# Patient Record
Sex: FEMALE | Race: BLACK OR AFRICAN AMERICAN | Employment: FULL TIME | URBAN - METROPOLITAN AREA
[De-identification: names, ages, dates, MRNs, and addresses within clinical notes are randomized per-mention and may not be internally consistent; named-entity substitution may affect disease eponyms.]

---

## 2017-12-15 PROBLEM — R00.2 PALPITATIONS: Status: ACTIVE | Noted: 2017-12-15

## 2018-01-09 PROBLEM — Z79.01 CHRONIC ANTICOAGULATION: Status: ACTIVE | Noted: 2018-01-09

## 2018-04-16 PROBLEM — J30.1 SEASONAL ALLERGIC RHINITIS DUE TO POLLEN: Status: ACTIVE | Noted: 2018-04-16

## 2018-08-15 ENCOUNTER — HOSPITAL ENCOUNTER (EMERGENCY)
Age: 58
Discharge: HOME | End: 2018-08-15
Attending: EMERGENCY MEDICINE
Payer: COMMERCIAL

## 2018-08-15 VITALS
WEIGHT: 160 LBS | DIASTOLIC BLOOD PRESSURE: 68 MMHG | HEIGHT: 64 IN | SYSTOLIC BLOOD PRESSURE: 121 MMHG | OXYGEN SATURATION: 96 % | HEART RATE: 65 BPM | TEMPERATURE: 98 F | RESPIRATION RATE: 18 BRPM | BODY MASS INDEX: 27.31 KG/M2

## 2018-08-15 DIAGNOSIS — T14.8XXA MUSCLE STRAIN: Primary | ICD-10-CM

## 2018-08-15 PROCEDURE — 99283 EMERGENCY DEPT VISIT LOW MDM: CPT | Performed by: PHYSICIAN ASSISTANT

## 2018-08-15 RX ORDER — ACETAMINOPHEN 500 MG
500-1000 TABLET ORAL
Qty: 20 TAB | Refills: 0 | Status: SHIPPED | OUTPATIENT
Start: 2018-08-15 | End: 2018-12-18
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
## 2018-08-15 NOTE — ED NOTES
I have reviewed discharge instructions with the patient. The patient verbalized understanding. Patient left ED via Discharge Method: ambulatory to Home with (self). Opportunity for questions and clarification provided. Patient given 1 scripts. To continue your aftercare when you leave the hospital, you may receive an automated call from our care team to check in on how you are doing. This is a free service and part of our promise to provide the best care and service to meet your aftercare needs.  If you have questions, or wish to unsubscribe from this service please call [phone]. Thank you for Choosing our [facility] Emergency Department.

## 2018-08-17 ENCOUNTER — HOSPITAL ENCOUNTER (OUTPATIENT)
Dept: LAB | Age: 58
Discharge: HOME | End: 2018-08-17
Payer: COMMERCIAL

## 2018-08-17 DIAGNOSIS — Z79.01 CHRONIC ANTICOAGULATION: ICD-10-CM

## 2018-08-17 PROBLEM — R53.82 CHRONIC FATIGUE: Status: ACTIVE | Noted: 2018-08-17

## 2018-08-17 LAB
BASOPHILS # BLD: 0 K/UL
BASOPHILS NFR BLD: 1 % (ref 0–2)
DIFFERENTIAL METHOD BLD: NORMAL
EOSINOPHIL # BLD: 0.1 K/UL
EOSINOPHIL NFR BLD: 1 % (ref 0.5–7.8)
ERYTHROCYTE [DISTWIDTH] IN BLOOD BY AUTOMATED COUNT: 13 %
HCT VFR BLD AUTO: 37.6 % (ref 35.8–46.3)
HGB BLD-MCNC: 12.7 G/DL (ref 11.7–15.4)
IMM GRANULOCYTES # BLD: 0 K/UL
IMM GRANULOCYTES NFR BLD AUTO: 0 % (ref 0–5)
LYMPHOCYTES # BLD: 1.9 K/UL
LYMPHOCYTES NFR BLD: 42 % (ref 13–44)
MCH RBC QN AUTO: 30.4 PG (ref 26.1–32.9)
MCHC RBC AUTO-ENTMCNC: 33.8 G/DL (ref 31.4–35)
MCV RBC AUTO: 90 FL (ref 79.6–97.8)
MONOCYTES # BLD: 0.3 K/UL
MONOCYTES NFR BLD: 7 % (ref 4–12)
NEUTS SEG # BLD: 2.3 K/UL
NEUTS SEG NFR BLD: 49 % (ref 43–78)
NRBC # BLD: 0 K/UL (ref 0–0.2)
PLATELET # BLD AUTO: 178 K/UL (ref 150–450)
PMV BLD AUTO: 10.2 FL (ref 9.4–12.3)
RBC # BLD AUTO: 4.18 M/UL (ref 4.05–5.2)
WBC # BLD AUTO: 4.6 K/UL (ref 4.3–11.1)

## 2018-08-17 PROCEDURE — 85025 COMPLETE CBC W/AUTO DIFF WBC: CPT

## 2018-08-17 PROCEDURE — 36415 COLL VENOUS BLD VENIPUNCTURE: CPT

## 2021-07-07 ENCOUNTER — TRANSCRIBE ORDER (OUTPATIENT)
Dept: SCHEDULING | Age: 61
End: 2021-07-07

## 2021-07-07 DIAGNOSIS — Z12.31 SCREENING MAMMOGRAM FOR HIGH-RISK PATIENT: Primary | ICD-10-CM

## 2021-07-12 ENCOUNTER — HOSPITAL ENCOUNTER (OUTPATIENT)
Dept: MAMMOGRAPHY | Age: 61
Discharge: HOME | End: 2021-07-12
Attending: FAMILY MEDICINE

## 2021-07-12 DIAGNOSIS — Z12.31 SCREENING MAMMOGRAM FOR HIGH-RISK PATIENT: ICD-10-CM

## 2022-03-18 PROBLEM — J30.1 SEASONAL ALLERGIC RHINITIS DUE TO POLLEN: Status: ACTIVE | Noted: 2018-04-16

## 2022-03-18 PROBLEM — R00.2 PALPITATIONS: Status: ACTIVE | Noted: 2017-12-15

## 2022-03-18 PROBLEM — R53.82 CHRONIC FATIGUE: Status: ACTIVE | Noted: 2018-08-17

## 2022-03-20 PROBLEM — Z79.01 CHRONIC ANTICOAGULATION: Status: ACTIVE | Noted: 2018-01-09

## 2022-07-18 ENCOUNTER — HOSPITAL ENCOUNTER (OUTPATIENT)
Dept: MAMMOGRAPHY | Age: 62
Discharge: HOME | End: 2022-07-21

## 2022-07-18 DIAGNOSIS — R92.1 MAMMOGRAPHIC CALCIFICATION FOUND ON DIAGNOSTIC IMAGING OF BREAST: ICD-10-CM

## 2022-07-19 ENCOUNTER — APPOINTMENT (OUTPATIENT)
Dept: MAMMOGRAPHY | Age: 62
End: 2022-07-19
Payer: COMMERCIAL

## 2022-07-19 ENCOUNTER — HOSPITAL ENCOUNTER (OUTPATIENT)
Dept: MAMMOGRAPHY | Age: 62
Discharge: HOME | End: 2022-07-22
Payer: COMMERCIAL

## 2022-07-19 PROCEDURE — 77065 DX MAMMO INCL CAD UNI: CPT

## 2023-06-30 ENCOUNTER — OFFICE VISIT (OUTPATIENT)
Age: 63
End: 2023-06-30
Payer: COMMERCIAL

## 2023-06-30 VITALS
SYSTOLIC BLOOD PRESSURE: 130 MMHG | WEIGHT: 158.6 LBS | HEIGHT: 64 IN | HEART RATE: 53 BPM | BODY MASS INDEX: 27.08 KG/M2 | DIASTOLIC BLOOD PRESSURE: 80 MMHG

## 2023-06-30 DIAGNOSIS — I48.0 PAROXYSMAL ATRIAL FIBRILLATION (HCC): Primary | ICD-10-CM

## 2023-06-30 DIAGNOSIS — R00.1 BRADYCARDIA: ICD-10-CM

## 2023-06-30 PROCEDURE — 3079F DIAST BP 80-89 MM HG: CPT | Performed by: INTERNAL MEDICINE

## 2023-06-30 PROCEDURE — 93000 ELECTROCARDIOGRAM COMPLETE: CPT | Performed by: INTERNAL MEDICINE

## 2023-06-30 PROCEDURE — 99204 OFFICE O/P NEW MOD 45 MIN: CPT | Performed by: INTERNAL MEDICINE

## 2023-06-30 PROCEDURE — 3075F SYST BP GE 130 - 139MM HG: CPT | Performed by: INTERNAL MEDICINE

## 2023-06-30 RX ORDER — LEVOTHYROXINE SODIUM 88 UG/1
TABLET ORAL
COMMUNITY

## 2023-06-30 RX ORDER — AMLODIPINE BESYLATE 5 MG/1
TABLET ORAL
COMMUNITY

## 2023-06-30 RX ORDER — ASPIRIN 81 MG/1
81 TABLET ORAL WEEKLY
COMMUNITY
End: 2023-06-30

## 2023-06-30 RX ORDER — PRAVASTATIN SODIUM 40 MG
TABLET ORAL
COMMUNITY

## 2023-06-30 RX ORDER — SOTALOL HYDROCHLORIDE 160 MG/1
TABLET ORAL
COMMUNITY
End: 2023-06-30

## 2023-06-30 RX ORDER — SOTALOL HYDROCHLORIDE 80 MG/1
80 TABLET ORAL 2 TIMES DAILY
Qty: 60 TABLET | Refills: 3 | Status: SHIPPED | OUTPATIENT
Start: 2023-06-30

## 2023-06-30 ASSESSMENT — ENCOUNTER SYMPTOMS: SHORTNESS OF BREATH: 0

## 2023-07-03 ENCOUNTER — TELEPHONE (OUTPATIENT)
Age: 63
End: 2023-07-03

## 2023-07-03 NOTE — TELEPHONE ENCOUNTER
MEDICATION REFILL REQUEST      Name of Medication:  Rivroxaban  Dose:  20 mg  Frequency:  1 a day  Quantity:  90  Days' supply:  80 with refills      Pharmacy Name/Location:  Please call pt ,she said Dr Brigitte Kathleen wrote a RX Friday but she left it in her sisters car [place],so she is asking for it to be called in .   Please call [phone] or [phone]

## 2023-07-05 NOTE — TELEPHONE ENCOUNTER
Spoke with pt made her aware prescription was sent to her requested pharmacy but required a prior auth. Prior auth submitted and approved. Pharmacy aware and will fill for pt. Attempted to call pt to make her aware and reached voicemail. Left message.

## 2023-07-05 NOTE — TELEPHONE ENCOUNTER
This request has been approved using information available on the patient's profile. [id number]; Review Type:Prior Auth; Coverage Start Date:06/05/2023; Coverage End Date:07/04/2024;

## 2023-07-07 ENCOUNTER — TRANSCRIBE ORDERS (OUTPATIENT)
Dept: SCHEDULING | Age: 63
End: 2023-07-07

## 2023-07-07 DIAGNOSIS — Z12.31 VISIT FOR SCREENING MAMMOGRAM: Primary | ICD-10-CM

## 2023-07-10 ENCOUNTER — HOSPITAL ENCOUNTER (OUTPATIENT)
Dept: MAMMOGRAPHY | Age: 63
Discharge: HOME | End: 2023-07-13
Payer: COMMERCIAL

## 2023-07-10 DIAGNOSIS — Z12.31 VISIT FOR SCREENING MAMMOGRAM: ICD-10-CM

## 2023-07-10 PROCEDURE — 77063 BREAST TOMOSYNTHESIS BI: CPT

## 2023-08-02 ENCOUNTER — TELEPHONE (OUTPATIENT)
Age: 63
End: 2023-08-02

## 2023-09-21 ENCOUNTER — TELEPHONE (OUTPATIENT)
Age: 63
End: 2023-09-21

## 2023-09-21 NOTE — TELEPHONE ENCOUNTER
Never got a call to schedule the procedure she was to have done. Has started on Xarelto and expensive.  Planning to retire next year and dont think she can afford it but right now its 61 a month Please call

## 2023-09-21 NOTE — TELEPHONE ENCOUNTER
I spoke with the patient and informed her we would have to address Xarelto cost when she changes insurance. I informed her I would have scheduling give her a call to schedule the TTE that was ordered on 06/30/2023.

## 2023-10-18 ENCOUNTER — TELEPHONE (OUTPATIENT)
Age: 63
End: 2023-10-18

## 2023-10-18 NOTE — TELEPHONE ENCOUNTER
MEDICATION REFILL REQUEST      Name of Medication:  Xarelto  Dose:  20 mg  Frequency:  QD  Quantity:  90  Days' supply:  90      Pharmacy Name/Location:  Call pt, no pharmacy left

## 2023-10-20 NOTE — TELEPHONE ENCOUNTER
Multiple attempts made and voicemail's left for the patient. Patient did not leave pharmacy location. Also, Xarelto was refilled on 06/30/2023, 90 day supply with three refills.

## 2024-04-01 NOTE — PROGRESS NOTES
in this patient's care.  Please call or contact me if there are any questions or concerns regarding the above.      MED ADORNO MD  04/02/24  3:37 PM

## 2024-04-02 ENCOUNTER — OFFICE VISIT (OUTPATIENT)
Age: 64
End: 2024-04-02
Payer: COMMERCIAL

## 2024-04-02 VITALS
DIASTOLIC BLOOD PRESSURE: 88 MMHG | BODY MASS INDEX: 27.66 KG/M2 | HEIGHT: 64 IN | WEIGHT: 162 LBS | HEART RATE: 56 BPM | SYSTOLIC BLOOD PRESSURE: 130 MMHG

## 2024-04-02 DIAGNOSIS — I10 ESSENTIAL HYPERTENSION: ICD-10-CM

## 2024-04-02 DIAGNOSIS — Z79.01 CHRONIC ANTICOAGULATION: ICD-10-CM

## 2024-04-02 DIAGNOSIS — I48.0 PAROXYSMAL ATRIAL FIBRILLATION (HCC): Primary | ICD-10-CM

## 2024-04-02 PROCEDURE — 99214 OFFICE O/P EST MOD 30 MIN: CPT | Performed by: INTERNAL MEDICINE

## 2024-04-02 PROCEDURE — 3079F DIAST BP 80-89 MM HG: CPT | Performed by: INTERNAL MEDICINE

## 2024-04-02 PROCEDURE — 3075F SYST BP GE 130 - 139MM HG: CPT | Performed by: INTERNAL MEDICINE

## 2024-04-02 ASSESSMENT — ENCOUNTER SYMPTOMS: SHORTNESS OF BREATH: 0

## 2024-04-25 ENCOUNTER — TELEPHONE (OUTPATIENT)
Age: 64
End: 2024-04-25

## 2024-04-25 NOTE — TELEPHONE ENCOUNTER
Patient having Colonoscopy on 06/25/24 with Dr. Hinton. Requesting risk assessment and Eliquis hold for 48 hours prior. Fax: [phone]

## 2024-04-25 NOTE — TELEPHONE ENCOUNTER
Per office note 4/2/24:    Low risk for colonoscopy and to hold Xarelto 24-48 hours prior.     Assessment faxed.

## 2024-04-26 ENCOUNTER — TELEPHONE (OUTPATIENT)
Age: 64
End: 2024-04-26

## 2024-04-26 NOTE — TELEPHONE ENCOUNTER
Gastroenterology calling stating that they received clearance holding Xarelto but patient told office that she is not on Xarelto because of price . She is on eliquis . Please advise hold for Eliquis. Fax: [phone]

## 2024-04-29 ENCOUNTER — TELEPHONE (OUTPATIENT)
Age: 64
End: 2024-04-29

## 2024-04-29 NOTE — TELEPHONE ENCOUNTER
----- Message from Norma Holbrook MD sent at 4/29/2024 12:53 PM EDT -----  Echo unchanged from previous.  Stay the course

## 2024-04-29 NOTE — TELEPHONE ENCOUNTER
Pt is calling saying Dr Krishna gave  her a card for her to get Xarelto prescription at a discount ,so pt called them and they said they need a RX,Please call Pt and speak with her anout this please

## 2024-04-29 NOTE — TELEPHONE ENCOUNTER
Called and spoke with patient and asked which blood thinner she had been taking because we were informed she was taking Eliquis not Xarelto. She stated that the Eliquis was upsetting her stomach so Dr. Krishna told her she could go back on Xarelto. Advised I would speak with Dr. Krishna and then speak to pharmacy. Voiced understanding.

## 2024-04-30 NOTE — TELEPHONE ENCOUNTER
Called and spoke with pharmacy to verify Xarelto rx was on file and that patient did not have an rx for Eliquis. Stated the Xarelto is ready and waiting for patient to .     Attempted to call patient to inform her it was ready but there was no answer. LVM for patient to return call.

## 2024-04-30 NOTE — TELEPHONE ENCOUNTER
Informed patient that Xarelto is ready for  and made sure she is aware that she is not supposed to take Eliquis if she takes Xarelto. Voiced understanding.

## 2024-06-26 ENCOUNTER — TELEPHONE (OUTPATIENT)
Age: 64
End: 2024-06-26

## 2024-06-26 NOTE — TELEPHONE ENCOUNTER
Pt walk in to [place] office asking for xarelto 20mg coupon. I gave her [facility] info and 4 bottles of samples.

## 2024-06-27 ENCOUNTER — TELEPHONE (OUTPATIENT)
Age: 64
End: 2024-06-27

## 2024-06-27 NOTE — TELEPHONE ENCOUNTER
MEDICATION REFILL REQUEST      Name of Medication: Xarelto  Dose:  20 mg  Frequency:  QD  Quantity:  90  Days' supply:  90 with 3 refills    Pharmacy Name/Location:  [facility] -

## 2025-02-04 ENCOUNTER — TRANSCRIBE ORDERS (OUTPATIENT)
Dept: SCHEDULING | Age: 65
End: 2025-02-04

## 2025-02-04 DIAGNOSIS — Z78.0 POSTMENOPAUSAL STATUS: Primary | ICD-10-CM

## 2025-02-04 DIAGNOSIS — Z12.31 OTHER SCREENING MAMMOGRAM: Primary | ICD-10-CM

## 2025-04-03 NOTE — PROGRESS NOTES
[facility] CARDIOLOGY  [address] [place] [address], [address]  [place] [address]  PHONE: [phone]      25    NAME:  Argentina Germain  : 1960  MRN: [id number]         SUBJECTIVE:   Argentina Germain is a 64 y.o. female seen for a follow up visit regarding the following:     Chief Complaint   Patient presents with    Follow-up    Atrial Fibrillation            HPI:  Follow up  Follow-up and Atrial Fibrillation   .    Follow up atrial fib, intermittent compliance.  She has felt ok overall. She has what she believes to be gas, she feels the heart skip a beat briefly then right back to normal.  Happens infrequently in general, had some the last couple of days.  Improves with belching, no other exacerbating factors.  Minimal caffeine.         PAST CARDIAC HISTORY:       PAF - sotalol, Eliquis - pt stopped eliquis and away from cardiology care a number of years         Ambulatory monitor - results not available         Echo - EF 53%, normal DF, mild LAE, mild MR, TR, RVSP 39, trace effusion  2023       Resumed AC with Xarelto.  No showed for echo  2024       EF 53%, septal thickening, abn DF, mild MR, mod TR, RVSP 40, mild LAE, small effusion                Cardiac Medications       Calcium Channel Blockers       amLODIPine (NORVASC) 5 MG tablet amlodipine 5 mg tablet   TAKE 1 TABLET BY MOUTH ONCE DAILY       Beta Blockers Non-Selective       sotalol (BETAPACE) 80 MG tablet Take 1 tablet by mouth 2 times daily       HMG CoA Reductase Inhibitors       pravastatin (PRAVACHOL) 40 MG tablet pravastatin 40 mg tablet       Direct Factor Xa Inhibitors       rivaroxaban (XARELTO) 20 MG TABS tablet Take 1 tablet by mouth Daily with supper                  Past Medical History, Past Surgical History, Family history, Social History, and Medications were all reviewed with the patient today and updated as necessary.     Prior to Admission medications    Medication Sig Start Date End Date Taking? Authorizing

## 2025-04-04 ENCOUNTER — OFFICE VISIT (OUTPATIENT)
Age: 65
End: 2025-04-04
Payer: MEDICARE

## 2025-04-04 VITALS
SYSTOLIC BLOOD PRESSURE: 110 MMHG | WEIGHT: 173 LBS | DIASTOLIC BLOOD PRESSURE: 70 MMHG | HEIGHT: 64 IN | HEART RATE: 51 BPM | BODY MASS INDEX: 29.53 KG/M2

## 2025-04-04 DIAGNOSIS — Z79.899 HIGH RISK MEDICATION USE: ICD-10-CM

## 2025-04-04 DIAGNOSIS — I10 ESSENTIAL HYPERTENSION: Primary | ICD-10-CM

## 2025-04-04 DIAGNOSIS — Z79.01 CHRONIC ANTICOAGULATION: ICD-10-CM

## 2025-04-04 DIAGNOSIS — E78.5 DYSLIPIDEMIA: ICD-10-CM

## 2025-04-04 DIAGNOSIS — I48.0 PAROXYSMAL ATRIAL FIBRILLATION (HCC): ICD-10-CM

## 2025-04-04 LAB
ANION GAP SERPL CALC-SCNC: 10 MMOL/L (ref 7–16)
BUN SERPL-MCNC: 12 MG/DL (ref 8–23)
CALCIUM SERPL-MCNC: 9 MG/DL (ref 8.8–10.2)
CHLORIDE SERPL-SCNC: 106 MMOL/L (ref 98–107)
CO2 SERPL-SCNC: 26 MMOL/L (ref 20–29)
CREAT SERPL-MCNC: 0.97 MG/DL (ref 0.6–1.1)
GLUCOSE SERPL-MCNC: 104 MG/DL (ref 70–99)
POTASSIUM SERPL-SCNC: 3.7 MMOL/L (ref 3.5–5.1)
SODIUM SERPL-SCNC: 142 MMOL/L (ref 136–145)

## 2025-04-04 PROCEDURE — G8400 PT W/DXA NO RESULTS DOC: HCPCS | Performed by: INTERNAL MEDICINE

## 2025-04-04 PROCEDURE — 3078F DIAST BP <80 MM HG: CPT | Performed by: INTERNAL MEDICINE

## 2025-04-04 PROCEDURE — 3017F COLORECTAL CA SCREEN DOC REV: CPT | Performed by: INTERNAL MEDICINE

## 2025-04-04 PROCEDURE — G8419 CALC BMI OUT NRM PARAM NOF/U: HCPCS | Performed by: INTERNAL MEDICINE

## 2025-04-04 PROCEDURE — 99214 OFFICE O/P EST MOD 30 MIN: CPT | Performed by: INTERNAL MEDICINE

## 2025-04-04 PROCEDURE — G8427 DOCREV CUR MEDS BY ELIG CLIN: HCPCS | Performed by: INTERNAL MEDICINE

## 2025-04-04 PROCEDURE — 93000 ELECTROCARDIOGRAM COMPLETE: CPT | Performed by: INTERNAL MEDICINE

## 2025-04-04 PROCEDURE — 1036F TOBACCO NON-USER: CPT | Performed by: INTERNAL MEDICINE

## 2025-04-04 PROCEDURE — 1123F ACP DISCUSS/DSCN MKR DOCD: CPT | Performed by: INTERNAL MEDICINE

## 2025-04-04 PROCEDURE — 1090F PRES/ABSN URINE INCON ASSESS: CPT | Performed by: INTERNAL MEDICINE

## 2025-04-04 PROCEDURE — 3074F SYST BP LT 130 MM HG: CPT | Performed by: INTERNAL MEDICINE

## 2025-04-04 RX ORDER — SOTALOL HYDROCHLORIDE 80 MG/1
80 TABLET ORAL 2 TIMES DAILY
Qty: 180 TABLET | Refills: 3 | Status: SHIPPED | OUTPATIENT
Start: 2025-04-04

## 2025-04-04 ASSESSMENT — ENCOUNTER SYMPTOMS: SHORTNESS OF BREATH: 0

## 2025-06-20 ENCOUNTER — HOSPITAL ENCOUNTER (OUTPATIENT)
Dept: MAMMOGRAPHY | Age: 65
Discharge: HOME | End: 2025-06-23
Payer: MEDICARE

## 2025-06-20 DIAGNOSIS — Z12.31 OTHER SCREENING MAMMOGRAM: ICD-10-CM

## 2025-06-20 DIAGNOSIS — Z78.0 POSTMENOPAUSAL STATUS: ICD-10-CM

## 2025-06-20 PROCEDURE — 77067 SCR MAMMO BI INCL CAD: CPT

## 2025-06-20 PROCEDURE — 77080 DXA BONE DENSITY AXIAL: CPT
